# Patient Record
Sex: MALE | Race: WHITE | NOT HISPANIC OR LATINO
[De-identification: names, ages, dates, MRNs, and addresses within clinical notes are randomized per-mention and may not be internally consistent; named-entity substitution may affect disease eponyms.]

---

## 2017-02-06 ENCOUNTER — APPOINTMENT (OUTPATIENT)
Dept: INTERNAL MEDICINE | Facility: CLINIC | Age: 22
End: 2017-02-06

## 2017-02-06 VITALS — WEIGHT: 212 LBS | BODY MASS INDEX: 29.68 KG/M2 | HEIGHT: 71 IN

## 2017-02-06 DIAGNOSIS — K64.8 OTHER HEMORRHOIDS: ICD-10-CM

## 2017-02-07 LAB
BASOPHILS # BLD AUTO: 0.03 K/UL
BASOPHILS NFR BLD AUTO: 0.4 %
EOSINOPHIL # BLD AUTO: 0.16 K/UL
EOSINOPHIL NFR BLD AUTO: 2.3 %
HCT VFR BLD CALC: 44.3 %
HGB BLD-MCNC: 14 G/DL
IMM GRANULOCYTES NFR BLD AUTO: 0.1 %
LYMPHOCYTES # BLD AUTO: 2.5 K/UL
LYMPHOCYTES NFR BLD AUTO: 36.6 %
MAN DIFF?: NORMAL
MCHC RBC-ENTMCNC: 28.6 PG
MCHC RBC-ENTMCNC: 31.6 GM/DL
MCV RBC AUTO: 90.6 FL
MONOCYTES # BLD AUTO: 0.34 K/UL
MONOCYTES NFR BLD AUTO: 5 %
NEUTROPHILS # BLD AUTO: 3.79 K/UL
NEUTROPHILS NFR BLD AUTO: 55.6 %
PLATELET # BLD AUTO: 278 K/UL
RBC # BLD: 4.89 M/UL
RBC # FLD: 13.5 %
WBC # FLD AUTO: 6.83 K/UL

## 2018-04-22 ENCOUNTER — FORM ENCOUNTER (OUTPATIENT)
Age: 23
End: 2018-04-22

## 2018-04-23 ENCOUNTER — LABORATORY RESULT (OUTPATIENT)
Age: 23
End: 2018-04-23

## 2018-04-23 ENCOUNTER — OUTPATIENT (OUTPATIENT)
Dept: OUTPATIENT SERVICES | Facility: HOSPITAL | Age: 23
LOS: 1 days | End: 2018-04-23
Payer: COMMERCIAL

## 2018-04-23 ENCOUNTER — NON-APPOINTMENT (OUTPATIENT)
Age: 23
End: 2018-04-23

## 2018-04-23 ENCOUNTER — APPOINTMENT (OUTPATIENT)
Dept: INTERNAL MEDICINE | Facility: CLINIC | Age: 23
End: 2018-04-23
Payer: COMMERCIAL

## 2018-04-23 ENCOUNTER — APPOINTMENT (OUTPATIENT)
Dept: ULTRASOUND IMAGING | Facility: CLINIC | Age: 23
End: 2018-04-23
Payer: COMMERCIAL

## 2018-04-23 ENCOUNTER — TRANSCRIPTION ENCOUNTER (OUTPATIENT)
Age: 23
End: 2018-04-23

## 2018-04-23 VITALS — SYSTOLIC BLOOD PRESSURE: 116 MMHG | HEART RATE: 72 BPM | RESPIRATION RATE: 14 BRPM | DIASTOLIC BLOOD PRESSURE: 70 MMHG

## 2018-04-23 VITALS — BODY MASS INDEX: 30.13 KG/M2 | HEIGHT: 71.5 IN | WEIGHT: 220 LBS

## 2018-04-23 DIAGNOSIS — R22.1 LOCALIZED SWELLING, MASS AND LUMP, NECK: ICD-10-CM

## 2018-04-23 PROCEDURE — 99213 OFFICE O/P EST LOW 20 MIN: CPT | Mod: 25

## 2018-04-23 PROCEDURE — 36415 COLL VENOUS BLD VENIPUNCTURE: CPT

## 2018-04-23 PROCEDURE — 99395 PREV VISIT EST AGE 18-39: CPT | Mod: 25

## 2018-04-23 PROCEDURE — 76536 US EXAM OF HEAD AND NECK: CPT

## 2018-04-23 PROCEDURE — 93000 ELECTROCARDIOGRAM COMPLETE: CPT

## 2018-04-23 PROCEDURE — 94010 BREATHING CAPACITY TEST: CPT

## 2018-04-23 PROCEDURE — 99000 SPECIMEN HANDLING OFFICE-LAB: CPT

## 2018-04-23 PROCEDURE — 76536 US EXAM OF HEAD AND NECK: CPT | Mod: 26

## 2018-04-24 ENCOUNTER — TRANSCRIPTION ENCOUNTER (OUTPATIENT)
Age: 23
End: 2018-04-24

## 2018-04-25 LAB
25(OH)D3 SERPL-MCNC: 25 NG/ML
ALBUMIN SERPL ELPH-MCNC: 4 G/DL
ALP BLD-CCNC: 48 U/L
ALT SERPL-CCNC: 14 U/L
ANION GAP SERPL CALC-SCNC: 11 MMOL/L
APPEARANCE: ABNORMAL
AST SERPL-CCNC: 21 U/L
BASOPHILS # BLD AUTO: 0.03 K/UL
BASOPHILS NFR BLD AUTO: 0.6 %
BILIRUB SERPL-MCNC: 0.7 MG/DL
BILIRUBIN URINE: NEGATIVE
BLOOD URINE: NEGATIVE
BUN SERPL-MCNC: 15 MG/DL
CALCIUM SERPL-MCNC: 9.6 MG/DL
CHLORIDE SERPL-SCNC: 103 MMOL/L
CHOLEST SERPL-MCNC: 106 MG/DL
CHOLEST/HDLC SERPL: 2.3 RATIO
CO2 SERPL-SCNC: 27 MMOL/L
COLOR: ABNORMAL
CREAT SERPL-MCNC: 0.91 MG/DL
EOSINOPHIL # BLD AUTO: 0.14 K/UL
EOSINOPHIL NFR BLD AUTO: 2.9 %
FOLATE SERPL-MCNC: 10.5 NG/ML
GLUCOSE QUALITATIVE U: NEGATIVE MG/DL
GLUCOSE SERPL-MCNC: 65 MG/DL
HBA1C MFR BLD HPLC: 5.1 %
HCT VFR BLD CALC: 40.6 %
HDLC SERPL-MCNC: 47 MG/DL
HGB BLD-MCNC: 13.5 G/DL
IMM GRANULOCYTES NFR BLD AUTO: 0 %
KETONES URINE: NEGATIVE
LDLC SERPL CALC-MCNC: 49 MG/DL
LEUKOCYTE ESTERASE URINE: NEGATIVE
LYMPHOCYTES # BLD AUTO: 1.56 K/UL
LYMPHOCYTES NFR BLD AUTO: 32 %
MAGNESIUM SERPL-MCNC: 2 MG/DL
MAN DIFF?: NORMAL
MCHC RBC-ENTMCNC: 29.5 PG
MCHC RBC-ENTMCNC: 33.3 GM/DL
MCV RBC AUTO: 88.6 FL
MONOCYTES # BLD AUTO: 0.22 K/UL
MONOCYTES NFR BLD AUTO: 4.5 %
NEUTROPHILS # BLD AUTO: 2.93 K/UL
NEUTROPHILS NFR BLD AUTO: 60 %
NITRITE URINE: NEGATIVE
PH URINE: 5.5
PLATELET # BLD AUTO: 257 K/UL
POTASSIUM SERPL-SCNC: 4.6 MMOL/L
PROT SERPL-MCNC: 7.4 G/DL
PROTEIN URINE: NEGATIVE MG/DL
RBC # BLD: 4.58 M/UL
RBC # FLD: 13.6 %
SODIUM SERPL-SCNC: 141 MMOL/L
SPECIFIC GRAVITY URINE: 1.03
T4 FREE SERPL-MCNC: 1.2 NG/DL
T4 SERPL-MCNC: 6.9 UG/DL
TRIGL SERPL-MCNC: 48 MG/DL
TSH SERPL-ACNC: 1.06 UIU/ML
UROBILINOGEN URINE: NEGATIVE MG/DL
VIT B12 SERPL-MCNC: 519 PG/ML
WBC # FLD AUTO: 4.88 K/UL

## 2018-07-10 ENCOUNTER — INPATIENT (INPATIENT)
Facility: HOSPITAL | Age: 23
LOS: 0 days | Discharge: HOME | DRG: 340 | End: 2018-07-11
Attending: SURGERY | Admitting: SURGERY
Payer: COMMERCIAL

## 2018-07-10 ENCOUNTER — TRANSCRIPTION ENCOUNTER (OUTPATIENT)
Age: 23
End: 2018-07-10

## 2018-07-10 VITALS
HEIGHT: 71 IN | SYSTOLIC BLOOD PRESSURE: 120 MMHG | HEART RATE: 111 BPM | OXYGEN SATURATION: 100 % | WEIGHT: 214.95 LBS | RESPIRATION RATE: 17 BRPM | DIASTOLIC BLOOD PRESSURE: 73 MMHG | TEMPERATURE: 98 F

## 2018-07-10 DIAGNOSIS — K35.3 ACUTE APPENDICITIS WITH LOCALIZED PERITONITIS: ICD-10-CM

## 2018-07-10 DIAGNOSIS — Z90.49 ACQUIRED ABSENCE OF OTHER SPECIFIED PARTS OF DIGESTIVE TRACT: Chronic | ICD-10-CM

## 2018-07-10 LAB
ALBUMIN SERPL ELPH-MCNC: 4.2 G/DL — SIGNIFICANT CHANGE UP (ref 3.3–5)
ALP SERPL-CCNC: 57 U/L — SIGNIFICANT CHANGE UP (ref 40–120)
ALT FLD-CCNC: 14 U/L — SIGNIFICANT CHANGE UP (ref 10–45)
ANION GAP SERPL CALC-SCNC: 12 MMOL/L — SIGNIFICANT CHANGE UP (ref 5–17)
APPEARANCE UR: CLEAR — SIGNIFICANT CHANGE UP
APTT BLD: 31.5 SEC — SIGNIFICANT CHANGE UP (ref 27.5–37.4)
AST SERPL-CCNC: 17 U/L — SIGNIFICANT CHANGE UP (ref 10–40)
BASOPHILS # BLD AUTO: 0 K/UL — SIGNIFICANT CHANGE UP (ref 0–0.2)
BASOPHILS NFR BLD AUTO: 0.5 % — SIGNIFICANT CHANGE UP (ref 0–2)
BILIRUB SERPL-MCNC: 0.7 MG/DL — SIGNIFICANT CHANGE UP (ref 0.2–1.2)
BILIRUB UR-MCNC: NEGATIVE — SIGNIFICANT CHANGE UP
BLD GP AB SCN SERPL QL: NEGATIVE — SIGNIFICANT CHANGE UP
BUN SERPL-MCNC: 9 MG/DL — SIGNIFICANT CHANGE UP (ref 7–23)
CALCIUM SERPL-MCNC: 8.5 MG/DL — SIGNIFICANT CHANGE UP (ref 8.4–10.5)
CHLORIDE SERPL-SCNC: 102 MMOL/L — SIGNIFICANT CHANGE UP (ref 96–108)
CO2 SERPL-SCNC: 24 MMOL/L — SIGNIFICANT CHANGE UP (ref 22–31)
COLOR SPEC: YELLOW — SIGNIFICANT CHANGE UP
CREAT SERPL-MCNC: 0.89 MG/DL — SIGNIFICANT CHANGE UP (ref 0.5–1.3)
DIFF PNL FLD: NEGATIVE — SIGNIFICANT CHANGE UP
EOSINOPHIL # BLD AUTO: 0.1 K/UL — SIGNIFICANT CHANGE UP (ref 0–0.5)
EOSINOPHIL NFR BLD AUTO: 1.5 % — SIGNIFICANT CHANGE UP (ref 0–6)
GLUCOSE SERPL-MCNC: 92 MG/DL — SIGNIFICANT CHANGE UP (ref 70–99)
GLUCOSE UR QL: NEGATIVE — SIGNIFICANT CHANGE UP
HCT VFR BLD CALC: 41.2 % — SIGNIFICANT CHANGE UP (ref 39–50)
HGB BLD-MCNC: 14.3 G/DL — SIGNIFICANT CHANGE UP (ref 13–17)
INR BLD: 1.13 RATIO — SIGNIFICANT CHANGE UP (ref 0.88–1.16)
KETONES UR-MCNC: NEGATIVE — SIGNIFICANT CHANGE UP
LEUKOCYTE ESTERASE UR-ACNC: NEGATIVE — SIGNIFICANT CHANGE UP
LYMPHOCYTES # BLD AUTO: 0.4 K/UL — LOW (ref 1–3.3)
LYMPHOCYTES # BLD AUTO: 6.4 % — LOW (ref 13–44)
MCHC RBC-ENTMCNC: 31 PG — SIGNIFICANT CHANGE UP (ref 27–34)
MCHC RBC-ENTMCNC: 34.7 GM/DL — SIGNIFICANT CHANGE UP (ref 32–36)
MCV RBC AUTO: 89.3 FL — SIGNIFICANT CHANGE UP (ref 80–100)
MONOCYTES # BLD AUTO: 0.8 K/UL — SIGNIFICANT CHANGE UP (ref 0–0.9)
MONOCYTES NFR BLD AUTO: 12.5 % — SIGNIFICANT CHANGE UP (ref 2–14)
NEUTROPHILS # BLD AUTO: 5 K/UL — SIGNIFICANT CHANGE UP (ref 1.8–7.4)
NEUTROPHILS NFR BLD AUTO: 79.1 % — HIGH (ref 43–77)
NITRITE UR-MCNC: NEGATIVE — SIGNIFICANT CHANGE UP
PH UR: 7 — SIGNIFICANT CHANGE UP (ref 5–8)
PLATELET # BLD AUTO: 199 K/UL — SIGNIFICANT CHANGE UP (ref 150–400)
POTASSIUM SERPL-MCNC: 3.7 MMOL/L — SIGNIFICANT CHANGE UP (ref 3.5–5.3)
POTASSIUM SERPL-SCNC: 3.7 MMOL/L — SIGNIFICANT CHANGE UP (ref 3.5–5.3)
PROT SERPL-MCNC: 7.1 G/DL — SIGNIFICANT CHANGE UP (ref 6–8.3)
PROT UR-MCNC: NEGATIVE — SIGNIFICANT CHANGE UP
PROTHROM AB SERPL-ACNC: 12.4 SEC — SIGNIFICANT CHANGE UP (ref 9.8–12.7)
RBC # BLD: 4.61 M/UL — SIGNIFICANT CHANGE UP (ref 4.2–5.8)
RBC # FLD: 11.9 % — SIGNIFICANT CHANGE UP (ref 10.3–14.5)
RH IG SCN BLD-IMP: POSITIVE — SIGNIFICANT CHANGE UP
SODIUM SERPL-SCNC: 138 MMOL/L — SIGNIFICANT CHANGE UP (ref 135–145)
SP GR SPEC: 1.01 — SIGNIFICANT CHANGE UP (ref 1.01–1.02)
UROBILINOGEN FLD QL: NEGATIVE — SIGNIFICANT CHANGE UP
WBC # BLD: 6.3 K/UL — SIGNIFICANT CHANGE UP (ref 3.8–10.5)
WBC # FLD AUTO: 6.3 K/UL — SIGNIFICANT CHANGE UP (ref 3.8–10.5)

## 2018-07-10 PROCEDURE — 74177 CT ABD & PELVIS W/CONTRAST: CPT | Mod: 26

## 2018-07-10 PROCEDURE — 99285 EMERGENCY DEPT VISIT HI MDM: CPT

## 2018-07-10 RX ORDER — SODIUM CHLORIDE 9 MG/ML
1000 INJECTION INTRAMUSCULAR; INTRAVENOUS; SUBCUTANEOUS ONCE
Qty: 0 | Refills: 0 | Status: COMPLETED | OUTPATIENT
Start: 2018-07-10 | End: 2018-07-10

## 2018-07-10 RX ORDER — ACETAMINOPHEN 500 MG
1000 TABLET ORAL ONCE
Qty: 0 | Refills: 0 | Status: COMPLETED | OUTPATIENT
Start: 2018-07-10 | End: 2018-07-10

## 2018-07-10 RX ORDER — ENOXAPARIN SODIUM 100 MG/ML
40 INJECTION SUBCUTANEOUS DAILY
Qty: 0 | Refills: 0 | Status: DISCONTINUED | OUTPATIENT
Start: 2018-07-10 | End: 2018-07-11

## 2018-07-10 RX ORDER — CEFOTETAN DISODIUM 1 G
2 VIAL (EA) INJECTION EVERY 12 HOURS
Qty: 0 | Refills: 0 | Status: DISCONTINUED | OUTPATIENT
Start: 2018-07-10 | End: 2018-07-11

## 2018-07-10 RX ORDER — ONDANSETRON 8 MG/1
4 TABLET, FILM COATED ORAL ONCE
Qty: 0 | Refills: 0 | Status: COMPLETED | OUTPATIENT
Start: 2018-07-10 | End: 2018-07-10

## 2018-07-10 RX ORDER — SODIUM CHLORIDE 9 MG/ML
1000 INJECTION, SOLUTION INTRAVENOUS
Qty: 0 | Refills: 0 | Status: DISCONTINUED | OUTPATIENT
Start: 2018-07-10 | End: 2018-07-11

## 2018-07-10 RX ORDER — CEFOTETAN DISODIUM 1 G
1 VIAL (EA) INJECTION ONCE
Qty: 0 | Refills: 0 | Status: COMPLETED | OUTPATIENT
Start: 2018-07-10 | End: 2018-07-10

## 2018-07-10 RX ADMIN — Medication 400 MILLIGRAM(S): at 23:30

## 2018-07-10 RX ADMIN — SODIUM CHLORIDE 1000 MILLILITER(S): 9 INJECTION INTRAMUSCULAR; INTRAVENOUS; SUBCUTANEOUS at 19:32

## 2018-07-10 RX ADMIN — Medication 100 GRAM(S): at 22:38

## 2018-07-10 RX ADMIN — Medication 1000 MILLIGRAM(S): at 23:39

## 2018-07-10 NOTE — ED PROVIDER NOTE - MEDICAL DECISION MAKING DETAILS
22 M with fever and RLQ tenderness on exam. Labs, IVF, CT r/o appendicitis 22 M with fever and RLQ tenderness on exam. Labs, IVF, CT r/o appendicitis    Janice Brasher MD - Attending Physician: Pt here with periumbilical pain, now RLQ. +Fever. Labs, CT to r/o appy. Pt initially declined pain control

## 2018-07-10 NOTE — ED PROVIDER NOTE - ATTENDING CONTRIBUTION TO CARE
Janice Brasher MD - Attending Physician: I have personally seen and examined this patient with the resident/fellow.  I have fully participated in the care of this patient. I have reviewed all pertinent clinical information, including history, physical exam, plan and the Resident/Fellow’s note and agree except as noted. See MDM

## 2018-07-10 NOTE — ED PROVIDER NOTE - OBJECTIVE STATEMENT
22 M h/o lap band, p/w RLQ pain and fever. Pain started periumbilical today and worsened to right side. Had T at home 101. Spoke to his pmd who said to come to ED for eval. Slight nausea, no vomiting. Slight dysuria. No groin pain.

## 2018-07-10 NOTE — ED ADULT NURSE NOTE - CAS DISCH ACCOMP BY
[phone]  Pt will be heading to [facility] ER @ 7:30 pm  To clinical Transporter/Family

## 2018-07-10 NOTE — ED ADULT NURSE NOTE - CHPI ED SYMPTOMS NEG
no hematuria/no vomiting/no abdominal distension/no dysuria/no burning urination/no chills/no blood in stool/no nausea/no diarrhea/no fever

## 2018-07-10 NOTE — ED ADULT NURSE NOTE - OBJECTIVE STATEMENT
22 year old male presented to ED with c/o of RLQ abdominal pain starting this morning with temp 100.5 at home. Pt took Tylenol at 2PM. Temp currently 99.1. Pt denies CP, SOB, nausea/vomiting, numbness/tingling, cough, chills, dizziness, headache. Pt denies current abdominal pain, no rebound tenderness. Pt a&ox3, lung sounds clear, heart rate regular, abdomen soft nontender nondistended to palp. Skin intact. IV in right AC 20G and patent. Pt currently resting in bed with mom at bedside. Will continue to monitor and assess while offering support and reassurance.

## 2018-07-11 ENCOUNTER — RESULT REVIEW (OUTPATIENT)
Age: 23
End: 2018-07-11

## 2018-07-11 ENCOUNTER — TRANSCRIPTION ENCOUNTER (OUTPATIENT)
Age: 23
End: 2018-07-11

## 2018-07-11 VITALS
DIASTOLIC BLOOD PRESSURE: 78 MMHG | TEMPERATURE: 98 F | OXYGEN SATURATION: 97 % | SYSTOLIC BLOOD PRESSURE: 136 MMHG | RESPIRATION RATE: 18 BRPM | HEART RATE: 85 BPM

## 2018-07-11 DIAGNOSIS — Z46.51 ENCOUNTER FOR FITTING AND ADJUSTMENT OF GASTRIC LAP BAND: Chronic | ICD-10-CM

## 2018-07-11 LAB — RH IG SCN BLD-IMP: POSITIVE — SIGNIFICANT CHANGE UP

## 2018-07-11 PROCEDURE — 99285 EMERGENCY DEPT VISIT HI MDM: CPT | Mod: 25

## 2018-07-11 PROCEDURE — 86900 BLOOD TYPING SEROLOGIC ABO: CPT

## 2018-07-11 PROCEDURE — 85730 THROMBOPLASTIN TIME PARTIAL: CPT

## 2018-07-11 PROCEDURE — S2083 ADJUSTMENT GASTRIC BAND: CPT

## 2018-07-11 PROCEDURE — 85027 COMPLETE CBC AUTOMATED: CPT

## 2018-07-11 PROCEDURE — 86850 RBC ANTIBODY SCREEN: CPT

## 2018-07-11 PROCEDURE — 96375 TX/PRO/DX INJ NEW DRUG ADDON: CPT

## 2018-07-11 PROCEDURE — 96374 THER/PROPH/DIAG INJ IV PUSH: CPT | Mod: XU

## 2018-07-11 PROCEDURE — 80053 COMPREHEN METABOLIC PANEL: CPT

## 2018-07-11 PROCEDURE — 81003 URINALYSIS AUTO W/O SCOPE: CPT

## 2018-07-11 PROCEDURE — 85610 PROTHROMBIN TIME: CPT

## 2018-07-11 PROCEDURE — 86901 BLOOD TYPING SEROLOGIC RH(D): CPT

## 2018-07-11 PROCEDURE — 74177 CT ABD & PELVIS W/CONTRAST: CPT

## 2018-07-11 RX ORDER — OXYCODONE HYDROCHLORIDE 5 MG/1
5 TABLET ORAL EVERY 4 HOURS
Qty: 0 | Refills: 0 | Status: DISCONTINUED | OUTPATIENT
Start: 2018-07-11 | End: 2018-07-11

## 2018-07-11 RX ORDER — HYDROMORPHONE HYDROCHLORIDE 2 MG/ML
0.5 INJECTION INTRAMUSCULAR; INTRAVENOUS; SUBCUTANEOUS
Qty: 0 | Refills: 0 | Status: DISCONTINUED | OUTPATIENT
Start: 2018-07-11 | End: 2018-07-11

## 2018-07-11 RX ORDER — ACETAMINOPHEN 500 MG
1000 TABLET ORAL ONCE
Qty: 0 | Refills: 0 | Status: COMPLETED | OUTPATIENT
Start: 2018-07-11 | End: 2018-07-11

## 2018-07-11 RX ORDER — ONDANSETRON 8 MG/1
4 TABLET, FILM COATED ORAL ONCE
Qty: 0 | Refills: 0 | Status: COMPLETED | OUTPATIENT
Start: 2018-07-11 | End: 2018-07-11

## 2018-07-11 RX ORDER — ONDANSETRON 8 MG/1
4 TABLET, FILM COATED ORAL ONCE
Qty: 0 | Refills: 0 | Status: DISCONTINUED | OUTPATIENT
Start: 2018-07-11 | End: 2018-07-11

## 2018-07-11 RX ORDER — SODIUM CHLORIDE 9 MG/ML
1000 INJECTION, SOLUTION INTRAVENOUS
Qty: 0 | Refills: 0 | Status: DISCONTINUED | OUTPATIENT
Start: 2018-07-11 | End: 2018-07-11

## 2018-07-11 RX ORDER — ACETAMINOPHEN 500 MG
2 TABLET ORAL
Qty: 0 | Refills: 0 | COMMUNITY
Start: 2018-07-11

## 2018-07-11 RX ORDER — ACETAMINOPHEN 500 MG
650 TABLET ORAL EVERY 6 HOURS
Qty: 0 | Refills: 0 | Status: DISCONTINUED | OUTPATIENT
Start: 2018-07-11 | End: 2018-07-11

## 2018-07-11 RX ORDER — ENOXAPARIN SODIUM 100 MG/ML
40 INJECTION SUBCUTANEOUS DAILY
Qty: 0 | Refills: 0 | Status: DISCONTINUED | OUTPATIENT
Start: 2018-07-11 | End: 2018-07-11

## 2018-07-11 RX ADMIN — ENOXAPARIN SODIUM 40 MILLIGRAM(S): 100 INJECTION SUBCUTANEOUS at 12:21

## 2018-07-11 RX ADMIN — SODIUM CHLORIDE 125 MILLILITER(S): 9 INJECTION, SOLUTION INTRAVENOUS at 05:22

## 2018-07-11 RX ADMIN — SODIUM CHLORIDE 125 MILLILITER(S): 9 INJECTION, SOLUTION INTRAVENOUS at 01:09

## 2018-07-11 RX ADMIN — Medication 1000 MILLIGRAM(S): at 09:30

## 2018-07-11 RX ADMIN — SODIUM CHLORIDE 50 MILLILITER(S): 9 INJECTION, SOLUTION INTRAVENOUS at 17:26

## 2018-07-11 RX ADMIN — Medication 400 MILLIGRAM(S): at 08:35

## 2018-07-11 RX ADMIN — ONDANSETRON 4 MILLIGRAM(S): 8 TABLET, FILM COATED ORAL at 13:45

## 2018-07-11 RX ADMIN — Medication 100 GRAM(S): at 05:22

## 2018-07-11 NOTE — H&P ADULT - ATTENDING COMMENTS
I have seen and examined the patient.  I agree with the surgical resident's note.  The patient on the add on list of a laparoscopic appendectomy.  The resident assessment is incorrect - the patient had a cholecystectomy already.    Savage Blum contact information [phone] I have seen and examined the patient.  I agree with the surgical resident's note.  The patient on the add on list of a laparoscopic appendectomy.  The resident assessment is incorrect - the patient had a cholecystectomy already.  I have reviewed the CT images.    Savage Blum contact information [phone]

## 2018-07-11 NOTE — DISCHARGE NOTE ADULT - OTHER SIGNIFICANT FINDINGS
WOUND CARE:   BATHING: Please do not submerge wound underwater. You may shower and/or sponge bathe.  ACTIVITY: No heavy lifting or straining. Otherwise, you may return to your usual level of physical activity. If you are taking narcotic pain medication (such as Percocet), do NOT drive a car, operate machinery or make important decisions.  DIET: Return to your usual diet.  NOTIFY YOUR SURGEON IF: You have any bleeding that does not stop, any pus draining from your wound, any fever (over 100.4 F) or chills, persistent nausea/vomiting, persistent diarrhea, or if your pain is not controlled on your discharge pain medications.  FOLLOW-UP:  1. Follow-up with Dr. Blum within 1-2 weeks of discharge.  Please call office for appointment  2. Please follow up with your primary care physician in one week regarding your hospitalization.

## 2018-07-11 NOTE — H&P ADULT - NSHPPHYSICALEXAM_GEN_ALL_CORE
T(C): 37.1 (07-11-18 @ 01:39), Max: 37.8 (07-11-18 @ 00:32)  HR: 68 (07-11-18 @ 01:39) (68 - 111)  BP: 104/59 (07-11-18 @ 01:39) (104/59 - 122/70)  RR: 16 (07-11-18 @ 01:39) (16 - 18)  SpO2: 96% (07-11-18 @ 01:39) (96% - 100%)  Wt(kg): --      General: WN/WD NAD  Neurology: A&Ox3, nonfocal, follows commands  Eyes: PERRLA/ EOMI  ENT/Neck: Neck supple, trachea midline, No JVD  Respiratory: non labored breathing  CV: Normal rate regular rhythm  Abdominal: Soft, ND, tender in RLQ  Extremities: No edema, + peripheral pulses  Skin: No Rashes, Hematoma, Ecchymosis

## 2018-07-11 NOTE — DISCHARGE NOTE ADULT - HOSPITAL COURSE
Patient is a 22M with hx of lap band surgery in 2012 presented with 1 day of abdominal pain.  The pain started this morning when the patient woke up, was located around the umbilicus and throughout the day migrated to the RLQ.  He spiked a fever to 101.0 at home.  He spoke to his PMD on the phone who recommended he come to the ED for evaluation. A CT was performed that showed acute appendicitis. It was determined he would need a laproscopic appendectomy in the OR. In preparation for his surgery his lap band was deflated. The procedure was completed in the OR without comlplication. He was transferred to the floor in stable condition. He was tolerating a regular diet and his pain was well controlled. He was afebrile and his vital signs were stable. He was discharge in stable condition with outpatient follow up.

## 2018-07-11 NOTE — DISCHARGE NOTE ADULT - MEDICATION SUMMARY - MEDICATIONS TO TAKE
I will START or STAY ON the medications listed below when I get home from the hospital:    acetaminophen 325 mg oral tablet  -- 2 tab(s) by mouth every 6 hours, As needed, Mild Pain (1 - 3)  -- Indication: For OTC for pain

## 2018-07-11 NOTE — H&P ADULT - ASSESSMENT
Patient is a 22M, hx lap band and lap niru, with acute appendicitis  - admit to bariatric surgery under Dr. Blum  - plan for lap niru this morning  - NPO/IVF  - IV abx  - need to deflate gastric band prior to OR  - case discussed with bariatric fellow, Dr. Kike Alcala PGY2 Patient is a 22M, hx lap band and lap niru, with acute appendicitis  - admit to bariatric surgery under Dr. Blum  - plan for lap appy this morning  - NPO/IVF  - IV abx  - need to deflate gastric band prior to OR  - case discussed with bariatric fellow, Dr. Kike Alcala PGY2

## 2018-07-11 NOTE — H&P ADULT - HISTORY OF PRESENT ILLNESS
Patient is a 22M with hx of lap band surgery in 2012 presents with 1 day of abdominal pain.  The pain started this morning when the patient woke up, was located around the umbilicus and throughout the day migrated to the RLQ.  He spiked a fever to 101.0 at home.  He spoke to his PMD on the phone who recommended he come to the ED for evaluation.  The pain is not associated with N/V, diarrhea, or bloody BMs.  Since his lap band surgery, he has lost 150 lbs and has kept the weight off.

## 2018-07-11 NOTE — PROGRESS NOTE ADULT - SUBJECTIVE AND OBJECTIVE BOX
Hospital Day#1    Subjective: Feels ok. No  pain at gastric band port site     Objective: Pt preop for Lap Appendectomy this morning. Pt s/P lap adjustable Gastric Banding (2011) at [facility] requiring band deflation prior to surgery. Lap Band port palpable in RLQ of abdomen. Procedure explained to pt, area cleansed with betadine. Lap band port accessed with Hollis needle w/o difficulty, 7.5ml of saline removed. Area with trauma, covered with band-aid. Pt tolerated procedure well.                                               Vital Signs Last 24 Hrs  T(C): 37.1 (11 Jul 2018 04:46), Max: 37.8 (11 Jul 2018 00:32)  T(F): 98.7 (11 Jul 2018 04:46), Max: 100 (11 Jul 2018 00:32)  HR: 65 (11 Jul 2018 04:46) (65 - 111)  BP: 102/60 (11 Jul 2018 05:20) (94/59 - 122/70)  BP(mean): --  RR: 16 (11 Jul 2018 04:46) (16 - 18)  SpO2: 98% (11 Jul 2018 04:46) (96% - 100%)                                               I&O's Summary    10 Jul 2018 07:01  -  11 Jul 2018 07:00  --------------------------------------------------------  IN: 375 mL / OUT: 0 mL / NET: 375 mL                                                                          14.3   6.3   )-----------( 199      ( 10 Jul 2018 19:36 )             41.2                                                 07-10    138  |  102  |  9   ----------------------------<  92  3.7   |  24  |  0.89    Ca    8.5      10 Jul 2018 19:36    TPro  7.1  /  Alb  4.2  /  TBili  0.7  /  DBili  x   /  AST  17  /  ALT  14  /  AlkPhos  57  07-10    cefoTEtan  IVPB 2 Gram(s) IV Intermittent every 12 hours  enoxaparin Injectable 40 milliGRAM(s) SubCutaneous daily  lactated ringers. 1000 milliLiter(s) IV Continuous <Continuous>      Physical Exam:         Lungs:  clear breath sounds b/l       Heart:  Regular rate & rhythm       Abdomen:  Soft, non-distended.  Lap Band Port RLQ w/o rendness or pain.       Skin:  intact, pannus w/o rash       Extremities: + pulses, no edema, no calf tenderness, negative charity's      Assessment and Plan: S/P lap Band Deflation at bedside    - NPO   - DVT/GI prophylaxis, Incentive spirometry  - IVF Hydratrion  - OR for Lap Gustavo Ca, DNP, ANP  [phone]

## 2018-07-11 NOTE — H&P ADULT - NSHPLABSRESULTS_GEN_ALL_CORE
14.3   6.3   )-----------( 199      ( 10 Jul 2018 19:36 )             41.2     07-10    138  |  102  |  9   ----------------------------<  92  3.7   |  24  |  0.89    Ca    8.5      10 Jul 2018 19:36    TPro  7.1  /  Alb  4.2  /  TBili  0.7  /  DBili  x   /  AST  17  /  ALT  14  /  AlkPhos  57  07-10    LIVER FUNCTIONS - ( 10 Jul 2018 19:36 )  Alb: 4.2 g/dL / Pro: 7.1 g/dL / ALK PHOS: 57 U/L / ALT: 14 U/L / AST: 17 U/L / GGT: x           PT/INR - ( 10 Jul 2018 22:38 )   PT: 12.4 sec;   INR: 1.13 ratio         PTT - ( 10 Jul 2018 22:38 )  PTT:31.5 sec  Urinalysis Basic - ( 10 Jul 2018 21:21 )    Color: Yellow / Appearance: Clear / S.011 / pH: x  Gluc: x / Ketone: Negative  / Bili: Negative / Urobili: Negative   Blood: x / Protein: Negative / Nitrite: Negative   Leuk Esterase: Negative / RBC: x / WBC x   Sq Epi: x / Non Sq Epi: x / Bacteria: x      < from: CT Abdomen and Pelvis w/ Oral Cont and w/ IV Cont (07.10.18 @ 21:37) >    IMPRESSION:   Acute uncomplicated appendicitis.      < end of copied text >

## 2018-07-11 NOTE — DISCHARGE NOTE ADULT - CARE PROVIDER_API CALL
Savage Blum (MD), Surgery  [address] [facility]  [address] [place] [id number]  Phone: [phone]  Fax: [phone]

## 2018-07-11 NOTE — DISCHARGE NOTE ADULT - PATIENT PORTAL LINK FT
You can access the [facility] Patient Portal, offered by [facility], by registering with the following website: [email or web address]

## 2018-07-11 NOTE — BRIEF OPERATIVE NOTE - PROCEDURE
<<-----Click on this checkbox to enter Procedure Laparoscopic appendectomy  07/11/2018    Active  [id number]

## 2018-07-16 LAB — SURGICAL PATHOLOGY STUDY: SIGNIFICANT CHANGE UP

## 2018-07-18 ENCOUNTER — APPOINTMENT (OUTPATIENT)
Dept: INTERNAL MEDICINE | Facility: CLINIC | Age: 23
End: 2018-07-18
Payer: COMMERCIAL

## 2018-07-18 VITALS — DIASTOLIC BLOOD PRESSURE: 70 MMHG | HEART RATE: 72 BPM | RESPIRATION RATE: 14 BRPM | SYSTOLIC BLOOD PRESSURE: 112 MMHG

## 2018-07-18 DIAGNOSIS — K35.80 UNSPECIFIED ACUTE APPENDICITIS: ICD-10-CM

## 2018-07-18 PROCEDURE — 99496 TRANSJ CARE MGMT HIGH F2F 7D: CPT

## 2018-07-18 NOTE — HISTORY OF PRESENT ILLNESS
[de-identified] : Pt presents for evaluation s/p hospitalization for acute appendicitus. s/p laparascopic appendectomy.\par \par Feels well s/p surgery.\par No fever/chills.\par Moving bowels. \par NO abdominal pain.\par

## 2018-07-18 NOTE — ASSESSMENT
[FreeTextEntry1] : Records reviewed from hospital.\par \par F/U with surgery as well as bariatric surgery. Will need to have bariatric cuff reinflated.\par \par F/U if symptoms change.\par Annual CC

## 2018-07-18 NOTE — PHYSICAL EXAM
[No Acute Distress] : no acute distress [Normal Outer Ear/Nose] : the outer ears and nose were normal in appearance [Supple] : supple [No Lymphadenopathy] : no lymphadenopathy [No Respiratory Distress] : no respiratory distress  [Clear to Auscultation] : lungs were clear to auscultation bilaterally [No Accessory Muscle Use] : no accessory muscle use [Normal Rate] : normal rate  [Regular Rhythm] : with a regular rhythm [Normal S1, S2] : normal S1 and S2 [No Edema] : there was no peripheral edema [Soft] : abdomen soft [Non Tender] : non-tender [Non-distended] : non-distended [No HSM] : no HSM [Normal Bowel Sounds] : normal bowel sounds [de-identified] : incisions c/d/i

## 2018-07-24 ENCOUNTER — APPOINTMENT (OUTPATIENT)
Dept: SURGERY | Facility: CLINIC | Age: 23
End: 2018-07-24

## 2018-07-26 ENCOUNTER — APPOINTMENT (OUTPATIENT)
Dept: SURGERY | Facility: CLINIC | Age: 23
End: 2018-07-26
Payer: COMMERCIAL

## 2018-07-26 VITALS
SYSTOLIC BLOOD PRESSURE: 106 MMHG | RESPIRATION RATE: 15 BRPM | TEMPERATURE: 97.7 F | OXYGEN SATURATION: 98 % | HEART RATE: 60 BPM | DIASTOLIC BLOOD PRESSURE: 74 MMHG

## 2018-07-26 PROCEDURE — 99024 POSTOP FOLLOW-UP VISIT: CPT

## 2018-08-01 ENCOUNTER — TRANSCRIPTION ENCOUNTER (OUTPATIENT)
Age: 23
End: 2018-08-01

## 2019-05-02 ENCOUNTER — APPOINTMENT (OUTPATIENT)
Dept: INTERNAL MEDICINE | Facility: CLINIC | Age: 24
End: 2019-05-02
Payer: COMMERCIAL

## 2019-05-02 VITALS — HEART RATE: 66 BPM | RESPIRATION RATE: 14 BRPM | DIASTOLIC BLOOD PRESSURE: 70 MMHG | SYSTOLIC BLOOD PRESSURE: 110 MMHG

## 2019-05-02 VITALS — BODY MASS INDEX: 31.77 KG/M2 | HEIGHT: 71.5 IN | WEIGHT: 232 LBS

## 2019-05-02 PROCEDURE — 99213 OFFICE O/P EST LOW 20 MIN: CPT

## 2019-05-02 NOTE — PHYSICAL EXAM
[Normal Rate] : normal rate  [Clear to Auscultation] : lungs were clear to auscultation bilaterally [No Respiratory Distress] : no respiratory distress  [No Edema] : there was no peripheral edema [Soft] : abdomen soft [Regular Rhythm] : with a regular rhythm [Non Tender] : non-tender [Non-distended] : non-distended [Normal Bowel Sounds] : normal bowel sounds [de-identified] : reducible ventral hernia above umbilicus

## 2019-05-02 NOTE — HISTORY OF PRESENT ILLNESS
[FreeTextEntry8] : Pt presents for evaluation of "lump" above the umbilicus for the past two weeks. More apparent while standing.\par No nausea/vomiting/pain.

## 2019-05-07 ENCOUNTER — APPOINTMENT (OUTPATIENT)
Dept: SURGERY | Facility: CLINIC | Age: 24
End: 2019-05-07
Payer: COMMERCIAL

## 2019-05-07 VITALS
HEIGHT: 71 IN | SYSTOLIC BLOOD PRESSURE: 129 MMHG | BODY MASS INDEX: 30.8 KG/M2 | RESPIRATION RATE: 15 BRPM | DIASTOLIC BLOOD PRESSURE: 73 MMHG | WEIGHT: 220 LBS | OXYGEN SATURATION: 99 % | HEART RATE: 63 BPM | TEMPERATURE: 97.9 F

## 2019-05-07 PROCEDURE — 99244 OFF/OP CNSLTJ NEW/EST MOD 40: CPT

## 2019-05-07 NOTE — HISTORY OF PRESENT ILLNESS
[de-identified] : Vazquez is a 24 y/o male here for evaluation of abdominal wall bulge. Pt doesn’t have any pain. The patient's past history is significant for a lap band with a super umbilical incision and a year ago had a laparoscopic appendectomy with an incision just below the umbilicus. At the present time the patient has no nausea no vomiting and states that the bulge goes away when he lies. Patient also states that he has never had any pain.

## 2019-05-07 NOTE — ASSESSMENT
[FreeTextEntry1] : I discussed the repair of this reducible incisional hernia with the patient. He understands and will be done as an outpatient. He also understands that mesh will be used.\par \par I told the patient that we will need to set up to have the lap band fluid removed prior to surgery.\par \par I have discussed the risks and benefits  of surgery with the patient.  The risks which include  but are not exclusive of other issues included bleeding, bowel injury and the possibility of using mesh with the inherent risk of infection requiring the removal of the mesh.

## 2019-05-07 NOTE — PHYSICAL EXAM
[Normal Breath Sounds] : Normal breath sounds [Abdominal Masses] : No abdominal masses [Normal Heart Sounds] : normal heart sounds [No HSM] : no hepatosplenomegaly [Abdomen Tenderness] : ~T ~M No abdominal tenderness [No Rash or Lesion] : No rash or lesion [Alert] : alert [Oriented to Person] : oriented to person [Oriented to Place] : oriented to place [Oriented to Time] : oriented to time [Calm] : calm [de-identified] : wnl [de-identified] : Well-developed well-nourished white male in no acute distress [de-identified] : There is a reducible periumbilical incisional hernia present. [de-identified] : Normal strength and gait

## 2019-05-07 NOTE — CONSULT LETTER
[Dear  ___] : Dear  [unfilled], [Consult Letter:] : I had the pleasure of evaluating your patient, [unfilled]. [Please see my note below.] : Please see my note below. [Consult Closing:] : Thank you very much for allowing me to participate in the care of this patient.  If you have any questions, please do not hesitate to contact me. [FreeTextEntry3] : I have reviewed all the documentation for this encounter with the patient and have edited where appropriate\par \par Dr. Savage Blum [Sincerely,] : Sincerely,

## 2019-05-07 NOTE — PHYSICAL EXAM
[Normal Breath Sounds] : Normal breath sounds [Normal Heart Sounds] : normal heart sounds [Abdominal Masses] : No abdominal masses [Abdomen Tenderness] : ~T ~M No abdominal tenderness [No HSM] : no hepatosplenomegaly [No Rash or Lesion] : No rash or lesion [Alert] : alert [Oriented to Person] : oriented to person [Oriented to Place] : oriented to place [Calm] : calm [Oriented to Time] : oriented to time [de-identified] : Well-developed well-nourished white male in no acute distress [de-identified] : wnl [de-identified] : There is a reducible periumbilical incisional hernia present. [de-identified] : Normal strength and gait

## 2019-05-07 NOTE — HISTORY OF PRESENT ILLNESS
[de-identified] : Vazquez is a 22 y/o male here for evaluation of abdominal wall bulge. Pt doesn’t have any pain. The patient's past history is significant for a lap band with a super umbilical incision and a year ago had a laparoscopic appendectomy with an incision just below the umbilicus. At the present time the patient has no nausea no vomiting and states that the bulge goes away when he lies. Patient also states that he has never had any pain.

## 2019-05-08 LAB
MRSA SPEC QL CULT: NOT DETECTED
STAPH AUREUS (SA): NOT DETECTED

## 2019-05-09 ENCOUNTER — NON-APPOINTMENT (OUTPATIENT)
Age: 24
End: 2019-05-09

## 2019-05-09 ENCOUNTER — APPOINTMENT (OUTPATIENT)
Dept: INTERNAL MEDICINE | Facility: CLINIC | Age: 24
End: 2019-05-09
Payer: COMMERCIAL

## 2019-05-09 VITALS — SYSTOLIC BLOOD PRESSURE: 110 MMHG | DIASTOLIC BLOOD PRESSURE: 70 MMHG | HEART RATE: 66 BPM | RESPIRATION RATE: 14 BRPM

## 2019-05-09 PROCEDURE — 93000 ELECTROCARDIOGRAM COMPLETE: CPT

## 2019-05-09 PROCEDURE — 99000 SPECIMEN HANDLING OFFICE-LAB: CPT

## 2019-05-09 PROCEDURE — 99243 OFF/OP CNSLTJ NEW/EST LOW 30: CPT | Mod: 25

## 2019-05-09 PROCEDURE — 36415 COLL VENOUS BLD VENIPUNCTURE: CPT

## 2019-05-09 NOTE — HISTORY OF PRESENT ILLNESS
[No Pertinent Cardiac History] : no history of aortic stenosis, atrial fibrillation, coronary artery disease, recent myocardial infarction, or implantable device/pacemaker [No Adverse Anesthesia Reaction] : no adverse anesthesia reaction in self or family member [No Pertinent Pulmonary History] : no history of asthma, COPD, sleep apnea, or smoking [(Patient denies any chest pain, claudication, dyspnea on exertion, orthopnea, palpitations or syncope)] : Patient denies any chest pain, claudication, dyspnea on exertion, orthopnea, palpitations or syncope [FreeTextEntry1] : Incisional hernia repair [FreeTextEntry3] : Dr. Shad Cohen  [phone], pt fax  [FreeTextEntry4] : Pt presents for medical evaluation prior to hernia surgery. [FreeTextEntry2] : 5/10/19

## 2019-05-09 NOTE — PHYSICAL EXAM
[No Respiratory Distress] : no respiratory distress  [Regular Rhythm] : with a regular rhythm [Clear to Auscultation] : lungs were clear to auscultation bilaterally [Normal Rate] : normal rate  [Soft] : abdomen soft [No Edema] : there was no peripheral edema [Normal Bowel Sounds] : normal bowel sounds [Non Tender] : non-tender [Non-distended] : non-distended [de-identified] : reducible ventral hernia above umbilicus

## 2019-05-09 NOTE — ASSESSMENT
[Patient Optimized for Surgery] : Patient optimized for surgery [As per surgery] : as per surgery [No Further Testing Recommended] : no further testing recommended [FreeTextEntry4] : Pt is medically optimized for surgery pending normal CBC drawn STAT this afternoon. [FreeTextEntry7] : No NSAIDS/ASA prior to surgery

## 2019-05-10 LAB
BASOPHILS # BLD AUTO: 0.04 K/UL
BASOPHILS NFR BLD AUTO: 0.6 %
EOSINOPHIL # BLD AUTO: 0.2 K/UL
EOSINOPHIL NFR BLD AUTO: 2.8 %
HCT VFR BLD CALC: 44.1 %
HGB BLD-MCNC: 14.7 G/DL
IMM GRANULOCYTES NFR BLD AUTO: 0.1 %
LYMPHOCYTES # BLD AUTO: 2.02 K/UL
LYMPHOCYTES NFR BLD AUTO: 28.1 %
MAN DIFF?: NORMAL
MCHC RBC-ENTMCNC: 29.9 PG
MCHC RBC-ENTMCNC: 33.3 GM/DL
MCV RBC AUTO: 89.8 FL
MONOCYTES # BLD AUTO: 0.48 K/UL
MONOCYTES NFR BLD AUTO: 6.7 %
NEUTROPHILS # BLD AUTO: 4.45 K/UL
NEUTROPHILS NFR BLD AUTO: 61.7 %
PLATELET # BLD AUTO: 268 K/UL
RBC # BLD: 4.91 M/UL
RBC # FLD: 12.8 %
WBC # FLD AUTO: 7.2 K/UL

## 2019-05-29 ENCOUNTER — APPOINTMENT (OUTPATIENT)
Dept: SURGERY | Facility: HOSPITAL | Age: 24
End: 2019-05-29

## 2019-06-03 ENCOUNTER — NON-APPOINTMENT (OUTPATIENT)
Age: 24
End: 2019-06-03

## 2019-06-03 ENCOUNTER — APPOINTMENT (OUTPATIENT)
Dept: INTERNAL MEDICINE | Facility: CLINIC | Age: 24
End: 2019-06-03
Payer: COMMERCIAL

## 2019-06-03 VITALS — RESPIRATION RATE: 14 BRPM | DIASTOLIC BLOOD PRESSURE: 70 MMHG | SYSTOLIC BLOOD PRESSURE: 110 MMHG | HEART RATE: 72 BPM

## 2019-06-03 VITALS — BODY MASS INDEX: 34.56 KG/M2 | WEIGHT: 228 LBS | HEIGHT: 68 IN

## 2019-06-03 DIAGNOSIS — Z87.19 PERSONAL HISTORY OF OTHER DISEASES OF THE DIGESTIVE SYSTEM: ICD-10-CM

## 2019-06-03 DIAGNOSIS — Z01.818 ENCOUNTER FOR OTHER PREPROCEDURAL EXAMINATION: ICD-10-CM

## 2019-06-03 DIAGNOSIS — K43.2 INCISIONAL HERNIA W/OUT OBSTRUCTION OR GANGRENE: ICD-10-CM

## 2019-06-03 DIAGNOSIS — R21 RASH AND OTHER NONSPECIFIC SKIN ERUPTION: ICD-10-CM

## 2019-06-03 DIAGNOSIS — Z09 ENCOUNTER FOR FOLLOW-UP EXAMINATION AFTER COMPLETED TREATMENT FOR CONDITIONS OTHER THAN MALIGNANT NEOPLASM: ICD-10-CM

## 2019-06-03 PROCEDURE — 90471 IMMUNIZATION ADMIN: CPT

## 2019-06-03 PROCEDURE — G0444 DEPRESSION SCREEN ANNUAL: CPT

## 2019-06-03 PROCEDURE — 90715 TDAP VACCINE 7 YRS/> IM: CPT

## 2019-06-03 PROCEDURE — 94010 BREATHING CAPACITY TEST: CPT

## 2019-06-03 PROCEDURE — 99395 PREV VISIT EST AGE 18-39: CPT | Mod: 25

## 2019-06-03 PROCEDURE — 93000 ELECTROCARDIOGRAM COMPLETE: CPT

## 2019-06-03 PROCEDURE — 36415 COLL VENOUS BLD VENIPUNCTURE: CPT

## 2019-06-03 RX ORDER — MULTIVITAMIN
TABLET ORAL DAILY
Qty: 30 | Refills: 5 | Status: ACTIVE | COMMUNITY
Start: 2019-06-03

## 2019-06-03 NOTE — HEALTH RISK ASSESSMENT
[Excellent] : ~his/her~  mood as  excellent [Fully functional (bathing, dressing, toileting, transferring, walking, feeding)] : Fully functional (bathing, dressing, toileting, transferring, walking, feeding) [Fully functional (using the telephone, shopping, preparing meals, housekeeping, doing laundry, using] : Fully functional and needs no help or supervision to perform IADLs (using the telephone, shopping, preparing meals, housekeeping, doing laundry, using transportation, managing medications and managing finances)

## 2019-06-03 NOTE — ASSESSMENT
[FreeTextEntry1] : Blood work sent - pt to call next week to discuss.\par Routine health counselling provided.\par Obtain old records.\par \par Optho evaluation needed per routine\par \par Tdap given\par \par Annual CC \par Pt declines HIV / Hep C testing at this time.\par

## 2019-06-03 NOTE — HISTORY OF PRESENT ILLNESS
[Health Maintenance] : health maintenance [___ Year(s) Ago] : [unfilled] year(s) ago [Good] : good [Vision Problems] : He complains of vision problems [Glasses] : wearing glasses [Healthy Diet] : He consumes a diverse and healthy diet [Eye Exam < 1 Year] : an eye examination within the last year [Drug Abuse] : He uses illicit drugs [Sexually Active] : the patient is sexually active [Reviewed and Updated] : risk screening reviewed and updated [Reg. Dental Visits] : He does not have regular dental visits [Hearing Loss] : He denies hearing loss [Regular Exercise] : He does not exercise regularly [Weight Concerns] : He does not have any weight concerns [Alcohol Use] : He denies alcohol use [Tobacco Use] : He does not use tobacco [FreeTextEntry1] : Pt presents  for his annual physical.\par \par Year complicated by appendectomy and incisional hernia repair.

## 2019-06-03 NOTE — PHYSICAL EXAM
[General Appearance - Alert] : alert [General Appearance - In No Acute Distress] : in no acute distress [Sclera] : the sclera and conjunctiva were normal [Extraocular Movements] : extraocular movements were intact [PERRL With Normal Accommodation] : pupils were equal in size, round, and reactive to light [Outer Ear] : the ears and nose were normal in appearance [Neck Appearance] : the appearance of the neck was normal [Oropharynx] : the oropharynx was normal [Neck Cervical Mass (___cm)] : no neck mass was observed [Jugular Venous Distention Increased] : there was no jugular-venous distention [Thyroid Diffuse Enlargement] : the thyroid was not enlarged [Thyroid Nodule] : there were no palpable thyroid nodules [Auscultation Breath Sounds / Voice Sounds] : lungs were clear to auscultation bilaterally [Heart Sounds] : normal S1 and S2 [Heart Rate And Rhythm] : heart rate was normal and rhythm regular [Heart Sounds Gallop] : no gallops [Murmurs] : no murmurs [Heart Sounds Pericardial Friction Rub] : no pericardial rub [Full Pulse] : the pedal pulses are present [Edema] : there was no peripheral edema [Bowel Sounds] : normal bowel sounds [Abdomen Soft] : soft [Abdomen Tenderness] : non-tender [Patient Refused] : rectal exam was refused by the patient [Abdomen Mass (___ Cm)] : no abdominal mass palpated [Scrotum] : the scrotum was normal [Penis Abnormality] : the penis was normal [Testes Mass (___cm)] : there were no testicular masses [Testes Swelling] : the testicles were not swollen [Cervical Lymph Nodes Enlarged Posterior Bilaterally] : posterior cervical [Cervical Lymph Nodes Enlarged Anterior Bilaterally] : anterior cervical [No CVA Tenderness] : no ~M costovertebral angle tenderness [No Spinal Tenderness] : no spinal tenderness [Supraclavicular Lymph Nodes Enlarged Bilaterally] : supraclavicular [Nail Clubbing] : no clubbing  or cyanosis of the fingernails [Abnormal Walk] : normal gait [Musculoskeletal - Swelling] : no joint swelling seen [Skin Color & Pigmentation] : normal skin color and pigmentation [Skin Turgor] : normal skin turgor [Motor Tone] : muscle strength and tone were normal [Deep Tendon Reflexes (DTR)] : deep tendon reflexes were 2+ and symmetric [] : no rash [Sensation] : the sensory exam was normal to light touch and pinprick [Oriented To Time, Place, And Person] : oriented to person, place, and time [No Focal Deficits] : no focal deficits [Impaired Insight] : insight and judgment were intact [Affect] : the affect was normal [FreeTextEntry1] : mobile, 4-5mm nodule left neck ?cyst vs LN

## 2019-06-06 LAB
25(OH)D3 SERPL-MCNC: 35.8 NG/ML
ALBUMIN SERPL ELPH-MCNC: 4.4 G/DL
ALP BLD-CCNC: 52 U/L
ALT SERPL-CCNC: 14 U/L
ANION GAP SERPL CALC-SCNC: 9 MMOL/L
APPEARANCE: CLEAR
AST SERPL-CCNC: 14 U/L
BASOPHILS # BLD AUTO: 0.04 K/UL
BASOPHILS NFR BLD AUTO: 0.7 %
BILIRUB SERPL-MCNC: 0.6 MG/DL
BILIRUBIN URINE: NEGATIVE
BLOOD URINE: NEGATIVE
BUN SERPL-MCNC: 10 MG/DL
CALCIUM SERPL-MCNC: 9.6 MG/DL
CHLORIDE SERPL-SCNC: 102 MMOL/L
CHOLEST SERPL-MCNC: 119 MG/DL
CHOLEST/HDLC SERPL: 2.5 RATIO
CO2 SERPL-SCNC: 30 MMOL/L
COLOR: NORMAL
CREAT SERPL-MCNC: 0.78 MG/DL
EOSINOPHIL # BLD AUTO: 0.17 K/UL
EOSINOPHIL NFR BLD AUTO: 3 %
ESTIMATED AVERAGE GLUCOSE: 94 MG/DL
FOLATE SERPL-MCNC: 18.6 NG/ML
GLUCOSE QUALITATIVE U: NEGATIVE
GLUCOSE SERPL-MCNC: 86 MG/DL
HBA1C MFR BLD HPLC: 4.9 %
HCT VFR BLD CALC: 43.2 %
HDLC SERPL-MCNC: 48 MG/DL
HGB BLD-MCNC: 14.3 G/DL
IMM GRANULOCYTES NFR BLD AUTO: 0.4 %
KETONES URINE: NEGATIVE
LDLC SERPL CALC-MCNC: 53 MG/DL
LEUKOCYTE ESTERASE URINE: NEGATIVE
LYMPHOCYTES # BLD AUTO: 1.72 K/UL
LYMPHOCYTES NFR BLD AUTO: 30.4 %
MAGNESIUM SERPL-MCNC: 2.1 MG/DL
MAN DIFF?: NORMAL
MCHC RBC-ENTMCNC: 29.8 PG
MCHC RBC-ENTMCNC: 33.1 GM/DL
MCV RBC AUTO: 90 FL
MEV IGG FLD QL IA: 234 AU/ML
MEV IGG+IGM SER-IMP: POSITIVE
MONOCYTES # BLD AUTO: 0.48 K/UL
MONOCYTES NFR BLD AUTO: 8.5 %
NEUTROPHILS # BLD AUTO: 3.23 K/UL
NEUTROPHILS NFR BLD AUTO: 57 %
NITRITE URINE: NEGATIVE
PH URINE: 6.5
PLATELET # BLD AUTO: 273 K/UL
POTASSIUM SERPL-SCNC: 4.7 MMOL/L
PROT SERPL-MCNC: 7.5 G/DL
PROTEIN URINE: NEGATIVE
RBC # BLD: 4.8 M/UL
RBC # FLD: 12.8 %
SODIUM SERPL-SCNC: 141 MMOL/L
SPECIFIC GRAVITY URINE: 1.01
T4 FREE SERPL-MCNC: 1.3 NG/DL
T4 SERPL-MCNC: 7.2 UG/DL
TRIGL SERPL-MCNC: 89 MG/DL
TSH SERPL-ACNC: 1.23 UIU/ML
UROBILINOGEN URINE: NORMAL
VIT B12 SERPL-MCNC: 586 PG/ML
WBC # FLD AUTO: 5.66 K/UL

## 2021-04-20 ENCOUNTER — APPOINTMENT (OUTPATIENT)
Dept: INTERNAL MEDICINE | Facility: CLINIC | Age: 26
End: 2021-04-20
Payer: COMMERCIAL

## 2021-04-20 ENCOUNTER — NON-APPOINTMENT (OUTPATIENT)
Age: 26
End: 2021-04-20

## 2021-04-20 ENCOUNTER — APPOINTMENT (OUTPATIENT)
Dept: INTERNAL MEDICINE | Facility: CLINIC | Age: 26
End: 2021-04-20

## 2021-04-20 VITALS
BODY MASS INDEX: 32.07 KG/M2 | WEIGHT: 224 LBS | HEIGHT: 70 IN | OXYGEN SATURATION: 98 % | HEART RATE: 72 BPM | TEMPERATURE: 98.1 F

## 2021-04-20 VITALS — SYSTOLIC BLOOD PRESSURE: 112 MMHG | DIASTOLIC BLOOD PRESSURE: 72 MMHG

## 2021-04-20 DIAGNOSIS — Z98.84 BARIATRIC SURGERY STATUS: ICD-10-CM

## 2021-04-20 DIAGNOSIS — Z23 ENCOUNTER FOR IMMUNIZATION: ICD-10-CM

## 2021-04-20 DIAGNOSIS — Z11.59 ENCOUNTER FOR SCREENING FOR OTHER VIRAL DISEASES: ICD-10-CM

## 2021-04-20 DIAGNOSIS — J30.2 OTHER SEASONAL ALLERGIC RHINITIS: ICD-10-CM

## 2021-04-20 DIAGNOSIS — Z22.322 CARRIER OR SUSPECTED CARRIER OF METHICILLIN RESISTANT STAPHYLOCOCCUS AUREUS: ICD-10-CM

## 2021-04-20 DIAGNOSIS — R22.1 LOCALIZED SWELLING, MASS AND LUMP, NECK: ICD-10-CM

## 2021-04-20 DIAGNOSIS — Z00.00 ENCOUNTER FOR GENERAL ADULT MEDICAL EXAMINATION W/OUT ABNORMAL FINDINGS: ICD-10-CM

## 2021-04-20 DIAGNOSIS — K21.9 GASTRO-ESOPHAGEAL REFLUX DISEASE W/OUT ESOPHAGITIS: ICD-10-CM

## 2021-04-20 DIAGNOSIS — Z92.29 PERSONAL HISTORY OF OTHER DRUG THERAPY: ICD-10-CM

## 2021-04-20 DIAGNOSIS — Z87.438 PERSONAL HISTORY OF OTHER DISEASES OF MALE GENITAL ORGANS: ICD-10-CM

## 2021-04-20 PROCEDURE — 99395 PREV VISIT EST AGE 18-39: CPT | Mod: 25

## 2021-04-20 PROCEDURE — 93000 ELECTROCARDIOGRAM COMPLETE: CPT

## 2021-04-20 PROCEDURE — 36415 COLL VENOUS BLD VENIPUNCTURE: CPT

## 2021-04-20 PROCEDURE — G0447 BEHAVIOR COUNSEL OBESITY 15M: CPT | Mod: NC,59

## 2021-04-20 PROCEDURE — 99072 ADDL SUPL MATRL&STAF TM PHE: CPT

## 2021-04-20 PROCEDURE — 99213 OFFICE O/P EST LOW 20 MIN: CPT | Mod: 25

## 2021-04-20 NOTE — PLAN
[FreeTextEntry1] : #Chronic GERD: discussed at length on lifestyle modifications--decreasing coffee intake, acidic foods, late night eating. He would like to try lifestyle modifications prior to Rx med such as PPI and will call in future if symptoms persist despite consistent lifestyle change.\par #Attention issues: discussed with patient on his symptoms, he will try OTC zinc and Bcomplex to see if helps improve his focus, if not, will consider stimulant such as Adderall when studying--he will contact me in future if needs.\par Labs today, will f/u \par Flu vaccine: patient declined\par F/u 6m/prn

## 2021-04-20 NOTE — HISTORY OF PRESENT ILLNESS
[FreeTextEntry1] : her for check-up, c/o reflux symptoms, difficulty concentrating [de-identified] : FADI PAT is a 25 year old male who presents for annual comprehensive medical evaluation. Since last visit, he has been well, states that in 3/2020 he had COVID19, but had a mild course, recovered without event. He notes log history of reflux, has burning in epigastrium, especially when he goes to sleep too close to when he had dinner. Takes Tums ~3-4x/week chronically. He admits to also drinking coffee "all day" and does consume foods higher in acidic content at times. He denies any nausea/vomiting, no changes in bowel habits. 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 He also notes his brother and father also had attention issues, take medication for this. He himself has never been on medication previously, no h/o ADD/ADHD. ROS as below.

## 2021-04-20 NOTE — HEALTH RISK ASSESSMENT
[Good] : ~his/her~  mood as  good [Yes] : Yes [2 - 4 times a month (2 pts)] : 2-4 times a month (2 points) [1 or 2 (0 pts)] : 1 or 2 (0 points) [Never (0 pts)] : Never (0 points) [No] : In the past 12 months have you used drugs other than those required for medical reasons? No [No falls in past year] : Patient reported no falls in the past year [0] : 2) Feeling down, depressed, or hopeless: Not at all (0) [HIV Test offered] : HIV Test offered [Hepatitis C test offered] : Hepatitis C test offered [With Family] : lives with family [] :  [# Of Children ___] : has [unfilled] children [Feels Safe at Home] : Feels safe at home [Fully functional (bathing, dressing, toileting, transferring, walking, feeding)] : Fully functional (bathing, dressing, toileting, transferring, walking, feeding) [Fully functional (using the telephone, shopping, preparing meals, housekeeping, doing laundry, using] : Fully functional and needs no help or supervision to perform IADLs (using the telephone, shopping, preparing meals, housekeeping, doing laundry, using transportation, managing medications and managing finances) [Reports normal functional visual acuity (ie: able to read med bottle)] : Reports normal functional visual acuity [Smoke Detector] : smoke detector [Safety elements used in home] : safety elements used in home [Seat Belt] :  uses seat belt [Sunscreen] : uses sunscreen [Designated Healthcare Proxy] : Designated healthcare proxy [Name: ___] : Health Care Proxy's Name: [unfilled]  [Relationship: ___] : Relationship: [unfilled] [] : No [Audit-CScore] : 2 [de-identified] : [employer] elliptical daily ~ 15 min [de-identified] : balanced/varied, ++coffee, needs to drin more water [[id number]] : 0 [Change in mental status noted] : No change in mental status noted [Language] : denies difficulty with language [Behavior] : denies difficulty with behavior [Learning/Retaining New Information] : denies difficulty learning/retaining new information [Handling Complex Tasks] : denies difficulty handling complex tasks [Reasoning] : denies difficulty with reasoning [Spatial Ability and Orientation] : denies difficulty with spatial ability and orientation [Reports changes in hearing] : Reports no changes in hearing [Reports changes in vision] : Reports no changes in vision [TB Exposure] : is not being exposed to tuberculosis [FreeTextEntry2] : Student-- [de-identified] : wears glasses, had eye exam ~ 1 month ago, had slight increase in prescription

## 2021-04-20 NOTE — DATA REVIEWED
[FreeTextEntry1] : EKG: NSR @ 67 bpm, no ST-T wave abnormalities\par Hearing Screen: normal both ears\par Vitals all reviewed

## 2021-04-20 NOTE — COUNSELING
[Weigh Self Weekly] : weigh self weekly [Decrease Portions] : decrease portions [____ min/wk Activity] : [unfilled] min/wk activity [Good understanding] : Patient has a good understanding of disease, goals and obesity follow-up plan [FreeTextEntry4] : 15

## 2021-04-20 NOTE — PHYSICAL EXAM
[No JVD] : no jugular venous distention [Thyroid Normal, No Nodules] : the thyroid was normal and there were no nodules present [Supple] : supple [Normal] : affect was normal and insight and judgment were intact [de-identified] : port palpated right right upper abdomen [de-identified] : ~1 cm rubbery/comrpressible left anterior cervical lymph node

## 2021-04-21 ENCOUNTER — TRANSCRIPTION ENCOUNTER (OUTPATIENT)
Age: 26
End: 2021-04-21

## 2021-04-26 LAB
25(OH)D3 SERPL-MCNC: 19.5 NG/ML
ALBUMIN SERPL ELPH-MCNC: 4.7 G/DL
ALP BLD-CCNC: 64 U/L
ALT SERPL-CCNC: 13 U/L
ANION GAP SERPL CALC-SCNC: 11 MMOL/L
APPEARANCE: CLEAR
AST SERPL-CCNC: 18 U/L
BASOPHILS # BLD AUTO: 0.03 K/UL
BASOPHILS NFR BLD AUTO: 0.5 %
BILIRUB SERPL-MCNC: 0.8 MG/DL
BILIRUBIN URINE: NEGATIVE
BLOOD URINE: NEGATIVE
BUN SERPL-MCNC: 17 MG/DL
C TRACH RRNA SPEC QL NAA+PROBE: NOT DETECTED
CALCIUM SERPL-MCNC: 9.6 MG/DL
CHLORIDE SERPL-SCNC: 102 MMOL/L
CHOLEST SERPL-MCNC: 128 MG/DL
CO2 SERPL-SCNC: 26 MMOL/L
COLOR: YELLOW
CREAT SERPL-MCNC: 0.91 MG/DL
EOSINOPHIL # BLD AUTO: 0.09 K/UL
EOSINOPHIL NFR BLD AUTO: 1.6 %
ESTIMATED AVERAGE GLUCOSE: 100 MG/DL
GLUCOSE QUALITATIVE U: NEGATIVE
GLUCOSE SERPL-MCNC: 82 MG/DL
HBA1C MFR BLD HPLC: 5.1 %
HBV CORE IGG+IGM SER QL: NONREACTIVE
HBV SURFACE AB SER QL: REACTIVE
HBV SURFACE AG SER QL: NONREACTIVE
HCT VFR BLD CALC: 43 %
HCV AB SER QL: NONREACTIVE
HCV S/CO RATIO: 0.19 S/CO
HDLC SERPL-MCNC: 52 MG/DL
HGB BLD-MCNC: 14 G/DL
HIV1+2 AB SPEC QL IA.RAPID: NONREACTIVE
HSV 1+2 IGG SER IA-IMP: NEGATIVE
HSV 1+2 IGG SER IA-IMP: NEGATIVE
HSV1 IGG SER QL: 0.3 INDEX
HSV1 IGM SER QL: NORMAL TITER
HSV2 AB FLD-ACNC: NORMAL TITER
HSV2 IGG SER QL: 0.16 INDEX
IMM GRANULOCYTES NFR BLD AUTO: 0.5 %
KETONES URINE: NEGATIVE
LDLC SERPL CALC-MCNC: 61 MG/DL
LEUKOCYTE ESTERASE URINE: NEGATIVE
LYMPHOCYTES # BLD AUTO: 1.31 K/UL
LYMPHOCYTES NFR BLD AUTO: 23 %
MAN DIFF?: NORMAL
MCHC RBC-ENTMCNC: 29.4 PG
MCHC RBC-ENTMCNC: 32.6 GM/DL
MCV RBC AUTO: 90.1 FL
MONOCYTES # BLD AUTO: 0.71 K/UL
MONOCYTES NFR BLD AUTO: 12.5 %
N GONORRHOEA RRNA SPEC QL NAA+PROBE: NOT DETECTED
NEUTROPHILS # BLD AUTO: 3.52 K/UL
NEUTROPHILS NFR BLD AUTO: 61.9 %
NITRITE URINE: NEGATIVE
NONHDLC SERPL-MCNC: 76 MG/DL
PH URINE: 6
PLATELET # BLD AUTO: 286 K/UL
POTASSIUM SERPL-SCNC: 4.3 MMOL/L
PROT SERPL-MCNC: 7.5 G/DL
PROTEIN URINE: NEGATIVE
RBC # BLD: 4.77 M/UL
RBC # FLD: 13 %
SODIUM SERPL-SCNC: 140 MMOL/L
SOURCE AMPLIFICATION: NORMAL
SPECIFIC GRAVITY URINE: 1.02
T PALLIDUM AB SER QL IA: NEGATIVE
T4 FREE SERPL-MCNC: 1.2 NG/DL
TRIGL SERPL-MCNC: 74 MG/DL
TSH SERPL-ACNC: 0.97 UIU/ML
UROBILINOGEN URINE: NORMAL
WBC # FLD AUTO: 5.69 K/UL

## 2021-07-30 ENCOUNTER — NON-APPOINTMENT (OUTPATIENT)
Age: 26
End: 2021-07-30

## 2021-10-20 ENCOUNTER — APPOINTMENT (OUTPATIENT)
Dept: INTERNAL MEDICINE | Facility: CLINIC | Age: 26
End: 2021-10-20
Payer: COMMERCIAL

## 2021-10-20 VITALS — RESPIRATION RATE: 14 BRPM | DIASTOLIC BLOOD PRESSURE: 70 MMHG | HEART RATE: 72 BPM | SYSTOLIC BLOOD PRESSURE: 120 MMHG

## 2021-10-20 VITALS — WEIGHT: 224 LBS | HEIGHT: 70 IN | BODY MASS INDEX: 32.07 KG/M2

## 2021-10-20 DIAGNOSIS — R41.840 ATTENTION AND CONCENTRATION DEFICIT: ICD-10-CM

## 2021-10-20 DIAGNOSIS — Z23 ENCOUNTER FOR IMMUNIZATION: ICD-10-CM

## 2021-10-20 DIAGNOSIS — E66.9 OBESITY, UNSPECIFIED: ICD-10-CM

## 2021-10-20 PROCEDURE — 90686 IIV4 VACC NO PRSV 0.5 ML IM: CPT

## 2021-10-20 PROCEDURE — 99213 OFFICE O/P EST LOW 20 MIN: CPT | Mod: 25

## 2021-10-20 PROCEDURE — G0008: CPT

## 2021-10-20 NOTE — HISTORY OF PRESENT ILLNESS
[de-identified] : Pt presents for evaluation - states he has been going to school and having increasingly difficult time concentrating.\par States this has been an issue all his life - "should have been treated when I was younger"\par Never with formal diagnosis of ADD.

## 2021-10-20 NOTE — ASSESSMENT
[FreeTextEntry1] : flu vaccine given\par \par Psychiatry evaluation for formal evaluation and diagnosis of attention deficit - names given.

## 2022-04-11 PROBLEM — Z11.59 SCREENING FOR VIRAL DISEASE: Status: ACTIVE | Noted: 2021-04-20
